# Patient Record
(demographics unavailable — no encounter records)

---

## 2025-01-31 NOTE — HISTORY OF PRESENT ILLNESS
[FreeTextEntry1] : FU for hypertension - has not taken Amlodipine for 3 weeks - forgot it and was in [facility] Not so good with diet Not exercising

## 2025-01-31 NOTE — ASSESSMENT
[FreeTextEntry1] : Pt's BP is too high - will restart Amlodipine at 5 mg per day Pt to check with OB about Amlodipine before becoming pregnant FU 3 months

## 2025-02-12 NOTE — DISCUSSION/SUMMARY
[FreeTextEntry1] :  GYN Annual evaluation today -pap smear sent TVS done today We discussed - Patient verbalized understanding of all explanations, and her questions were answered, and all concerns were addressed. Patient was screened for depression - no signs of clinical depression. PHQ-2 on file Rx given for hormone labs RTO in May for f/u will be put on Clomid need TVS to check ovaries   I, Delta romero acting as scribe for . 02/12/2025

## 2025-02-12 NOTE — PHYSICAL EXAM
[Chaperone Present] : A chaperone was present in the examining room during all aspects of the physical examination [FreeTextEntry2] : HAIM Hernandez [Appropriately responsive] : appropriately responsive [Alert] : alert [No Acute Distress] : no acute distress [No Lymphadenopathy] : no lymphadenopathy [Soft] : soft [Non-tender] : non-tender [Non-distended] : non-distended [Oriented x3] : oriented x3 [FreeTextEntry6] : No tenderness, No nipple discharge and no adenopathy. [Examination Of The Breasts] : a normal appearance [No Masses] : no breast masses were palpable [Labia Majora] : normal [Labia Minora] : normal [Normal] : normal [Uterine Adnexae] : normal

## 2025-02-12 NOTE — HISTORY OF PRESENT ILLNESS
[FreeTextEntry1] : GYN Annual [TextBox_4] : 34YO patient presents for GYN annual exam. The patient reports that her ovaries size is between 1.8 and 2 cm, which might be a little small for someone her age (35 years). She first noticed these changes after her menstruation resumed following her pregnancy. Patient has been trying for months. [PapSmeardate] : 12/20/2022 [HPVDate] : 12/20/22 [LMPDate] : 1/17/25 [Currently Active] : currently active

## 2025-05-06 NOTE — HISTORY OF PRESENT ILLNESS
[N] : Patient does not use contraception [Y] : Patient is sexually active [No] : Patient does not have concerns regarding sex [Currently Active] : currently active [Men] : men [Patient reported PAP Smear was normal] : Patient reported PAP Smear was normal [PapSmeardate] : 2/12/25 [LMPDate] : 1/17/25 [FreeTextEntry1] : 1/17/25

## 2025-05-06 NOTE — PHYSICAL EXAM
[Chaperoned Physical Exam] : A chaperone was present in the examining room during all aspects of the physical examination.

## 2025-05-06 NOTE — DISCUSSION/SUMMARY
[FreeTextEntry1] : GYN evaluation today TVS done today  We discussed -- Amenorrhea and early pregnancy. Patient verbalized understanding of all explanations, and her questions were answered, and all concerns were addressed. Patient was screened for depression - no signs of clinical depression, PHQ-2 on file. RTO in n2 weeks for repeat sonogram   I, Delta romero acting as scribe for . 05/05/2025    The documentation recorded by the scribe, in my presence, accurately reflects the service I personally performed, and the decisions made by me with my edits as appropriate on 05/06/2025  Nayely Rodriguez MD, FACOG

## 2025-07-02 NOTE — HISTORY OF PRESENT ILLNESS
[FreeTextEntry1] : Patient is a 35 year old woman  who delivered at 36.2 weeks on 3/5/24 with course complicated by PEC now 19.6 weeks pregnant (JAKOB 25) With first pregnancy she was on procardia that was weaned off About 5 months postpartum she was started on Amlodipine- this was changed Nifedipine by OB when she discovered she was pregnant again Also  mg daily

## 2025-07-02 NOTE — DISCUSSION/SUMMARY
[EKG obtained to assist in diagnosis and management of assessed problem(s)] : EKG obtained to assist in diagnosis and management of assessed problem(s) [FreeTextEntry1] : Patient is a 35 year old woman  who delivered at 36.2 weeks on 3/5/24 with course complicated by PEC now 19.6 weeks pregnant (JAKOB 25) With first pregnancy she was on procardia that was weaned off About 5 months postpartum she was started on Amlodipine- this was changed Nifedipine by OB when she discovered she was pregnant again Also  mg daily Check TTE Continue Procardia 30 mg daily FU 8 weeks